# Patient Record
Sex: FEMALE | Race: WHITE | NOT HISPANIC OR LATINO | Employment: FULL TIME | URBAN - METROPOLITAN AREA
[De-identification: names, ages, dates, MRNs, and addresses within clinical notes are randomized per-mention and may not be internally consistent; named-entity substitution may affect disease eponyms.]

---

## 2024-02-05 ENCOUNTER — HOSPITAL ENCOUNTER (OUTPATIENT)
Dept: OTHER | Facility: HOSPITAL | Age: 34
Discharge: HOME | End: 2024-02-05

## 2024-08-20 ENCOUNTER — HOSPITAL ENCOUNTER (OUTPATIENT)
Dept: OTHER | Facility: HOSPITAL | Age: 34
Discharge: HOME | End: 2024-08-20

## 2024-10-08 ENCOUNTER — OFFICE VISIT (OUTPATIENT)
Dept: NEUROSURGERY | Facility: CLINIC | Age: 34
End: 2024-10-08
Payer: COMMERCIAL

## 2024-10-08 VITALS
OXYGEN SATURATION: 97 % | DIASTOLIC BLOOD PRESSURE: 83 MMHG | HEART RATE: 85 BPM | WEIGHT: 161.8 LBS | BODY MASS INDEX: 27.62 KG/M2 | SYSTOLIC BLOOD PRESSURE: 114 MMHG | HEIGHT: 64 IN

## 2024-10-08 DIAGNOSIS — M50.30 DEGENERATIVE DISC DISEASE, CERVICAL: Primary | ICD-10-CM

## 2024-10-08 DIAGNOSIS — M54.2 CERVICALGIA: ICD-10-CM

## 2024-10-08 RX ORDER — FAMOTIDINE 40 MG/1
40 TABLET, FILM COATED ORAL DAILY
COMMUNITY
Start: 2024-02-16 | End: 2025-02-16

## 2024-10-08 RX ORDER — CYCLOBENZAPRINE HCL 10 MG
10 TABLET ORAL 3 TIMES DAILY PRN
COMMUNITY
Start: 2024-09-23

## 2024-10-08 RX ORDER — DICLOFENAC SODIUM 75 MG/1
75 TABLET, DELAYED RELEASE ORAL 2 TIMES DAILY
COMMUNITY
Start: 2024-09-23

## 2024-10-08 RX ORDER — BUPROPION HYDROCHLORIDE 150 MG/1
150 TABLET ORAL DAILY
COMMUNITY
Start: 2024-08-12 | End: 2025-08-13

## 2024-10-08 RX ORDER — BUSPIRONE HYDROCHLORIDE 5 MG/1
5 TABLET ORAL 3 TIMES DAILY
COMMUNITY
Start: 2024-02-16 | End: 2025-02-16

## 2024-10-08 NOTE — PROGRESS NOTES
"Chief Complaint  Neck Pain (Pain started in January /MRI and Xray 9/20/24)    Subjective          Ivonne Quevedo who is a 34 y.o. year old female who presents to [facility] for evaluation of cervical spine.     History of Present Illness  The patient is a 34-year-old female presenting for neck pain.    She has been experiencing neck pain since the end of 2023, which she initially attributed to her shoulder. An orthopedic surgeon conducted tests on her shoulder but found no issues. She occasionally experiences soreness in her neck and headaches. The pain, which she describes as more of an uncomfortable sensation, currently rates at 2 or 3 out of 10. However, it can escalate to a 5 or 6 during strenuous activities or prolonged periods of looking down. She reports that the pain extends into her shoulder, but not her arm.    Diclofenac has been effective in managing her pain. She also takes Flexeril once before bedtime due to sleep disturbances caused by the pain. She has attempted physical therapy twice but found it unhelpful. She reports no numbness or tingling in her hands or arms.    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      Was this the result of an injury or accident? : No    History of Previous Spinal Surgery?: No    Nicotine use: non-smoker    BMI: Body mass index is 27.77 kg/m².      Review of Systems   Musculoskeletal:  Positive for myalgias, neck pain and neck stiffness.   All other systems reviewed and are negative.       Objective   Vital Signs:   /83 (BP Location: Left arm, Patient Position: Sitting, Cuff Size: Adult)   Pulse 85   Ht 162.6 cm (64\")   Wt 73.4 kg (161 lb 12.8 oz)   SpO2 97%   BMI 27.77 kg/m²       Physical Exam  Vitals reviewed.   Constitutional:       Appearance: Normal " appearance.   Musculoskeletal:      Right shoulder: No tenderness. Normal range of motion.      Left shoulder: No tenderness. Normal range of motion.      Cervical back: Tenderness present. Pain with movement present. Decreased range of motion.   Neurological:      Mental Status: She is alert.      Motor: Motor strength is normal.     Deep Tendon Reflexes: Reflexes are normal and symmetric.        Neurological Exam  Mental Status  Alert.    Motor   Strength is 5/5 throughout all four extremities.    Sensory  Sensation is intact to light touch, pinprick, vibration and proprioception in all four extremities.    Reflexes  Deep tendon reflexes are 2+ and symmetric in all four extremities.    Gait  Casual gait is normal including stance, stride, and arm swing.      Physical Exam         Result Review :       Data reviewed : Radiologic studies MRI Cervical Spine on 8/20/24 at [facility] personally reviewed and interpreted. DDD, most significant at C5/6 where there is mild spinal stenosis.            Assessment and Plan    Diagnoses and all orders for this visit:    1. Degenerative disc disease, cervical (Primary)    2. Cervicalgia        Assessment & Plan  1. Cervical disc degeneration.  Her MRI results indicate cervical disc degeneration with no significant nerve impingement or spinal stenosis. She was advised to avoid heavy lifting and overexertional activities to prevent further progression. Stretching exercises were recommended to support her neck. A referral for physical therapy was discussed, and dry needling was suggested as a potential treatment option. Trigger point injections with steroid and numbing medication were also discussed as an alternative through pain management. She was instructed to contact the office if her condition worsens. She has been provided with stretches and exercises to do at home.     2. Neck pain.  She reports neck pain that varies in intensity, aggravated by activities such as  cleaning or prolonged looking down at her phone. Diclofenac and Flexeril have been effective in managing her symptoms. She was advised to continue her current medication regimen, taking Flexeril at night to alleviate discomfort during sleep. If pain symptoms persist, further interventions such as dry needling or trigger point injections will be considered.           Follow Up   Return if symptoms worsen or fail to improve.  Patient was given instructions and counseling regarding her condition or for health maintenance advice.     Patient or patient representative verbalized consent for the use of Ambient Listening during the visit with  BRENTON Rahman for chart documentation. 10/8/2024  09:25 EDT    -Home exercises  -Follow up as needed

## 2024-10-09 ENCOUNTER — PATIENT ROUNDING (BHMG ONLY) (OUTPATIENT)
Dept: NEUROSURGERY | Facility: CLINIC | Age: 34
End: 2024-10-09
Payer: COMMERCIAL